# Patient Record
Sex: MALE | Race: WHITE | NOT HISPANIC OR LATINO | Employment: OTHER | URBAN - NONMETROPOLITAN AREA
[De-identification: names, ages, dates, MRNs, and addresses within clinical notes are randomized per-mention and may not be internally consistent; named-entity substitution may affect disease eponyms.]

---

## 2018-01-23 ENCOUNTER — OFFICE VISIT (OUTPATIENT)
Dept: CARDIOLOGY | Facility: CLINIC | Age: 33
End: 2018-01-23

## 2018-01-23 VITALS
BODY MASS INDEX: 35.64 KG/M2 | SYSTOLIC BLOOD PRESSURE: 131 MMHG | OXYGEN SATURATION: 97 % | HEIGHT: 71 IN | DIASTOLIC BLOOD PRESSURE: 83 MMHG | WEIGHT: 254.6 LBS | HEART RATE: 101 BPM

## 2018-01-23 DIAGNOSIS — R07.2 PRECORDIAL PAIN: ICD-10-CM

## 2018-01-23 DIAGNOSIS — R55 SYNCOPE, UNSPECIFIED SYNCOPE TYPE: ICD-10-CM

## 2018-01-23 DIAGNOSIS — I10 ESSENTIAL HYPERTENSION: ICD-10-CM

## 2018-01-23 DIAGNOSIS — R00.2 PALPITATIONS: ICD-10-CM

## 2018-01-23 PROCEDURE — 99204 OFFICE O/P NEW MOD 45 MIN: CPT | Performed by: PHYSICIAN ASSISTANT

## 2018-01-23 PROCEDURE — 93000 ELECTROCARDIOGRAM COMPLETE: CPT | Performed by: PHYSICIAN ASSISTANT

## 2018-01-23 NOTE — PROGRESS NOTES
"Lindsay Naik is a 33 y.o. male     Chief Complaint   Patient presents with   • Establish Care     patient appears in office today to est cardiac care   • Chest Pain   • Hypertension       HPI  The patient presents today for initial evaluation.  He presents because of syncope and chest pain.  The patient tells me that he was recently calling a car back from [place].  While in the [employer] area, he started noticing weakness and dizziness.  He pulled to the side of the road.  His wife was with him at that time.  He started noticing some degree of tachycardia.  He then developed diaphoresis and nausea.  Shortly after onset of all the symptoms, the patient tells me that he lost complete consciousness.  He was out for at least 3-5 minutes.  EMS was alerted and he was taken to the emergency room.  With the above episode, the patient does tell me that he again had mild chest tightness.  He had a sensation of tachycardia as well.  He had no neurologic symptoms including loss of bowel or bladder continence.  There was no seizure activity reported.  At the emergency room, labs and radiology studies were unremarkable.  He was diagnosed with \"vertigo\" and advised to pursue further evaluation as an outpatient.  Since the above time, the patient has had some degree of mild dizziness, but nothing of significance.  He certainly is had no further syncopal episode.  He has mild chest tightness at times, typically when stressed.  He has had no rhythm disturbance of significance.  He has had no failure or further dysrhythmic symptoms.  He has no further complaints otherwise.      Current Outpatient Prescriptions   Medication Sig Dispense Refill   • metoprolol tartrate (LOPRESSOR) 25 MG tablet Take 25 mg by mouth Daily. 1 and 1/2 tablets daily     • sertraline (ZOLOFT) 50 MG tablet Take 50 mg by mouth Daily.       No current facility-administered medications for this visit.        Review of patient's allergies " indicates no known allergies.    Past Medical History:   Diagnosis Date   • Hypertension    • Pancreatitis        Social History     Social History   • Marital status:      Spouse name: N/A   • Number of children: N/A   • Years of education: N/A     Occupational History   • Not on file.     Social History Main Topics   • Smoking status: Current Every Day Smoker     Packs/day: 1.00     Years: 15.00     Types: Cigarettes   • Smokeless tobacco: Never Used   • Alcohol use No   • Drug use: No   • Sexual activity: Defer     Other Topics Concern   • Not on file     Social History Narrative   • No narrative on file       Family History   Problem Relation Age of Onset   • No Known Problems Mother    • Diabetes Father    • Hypertension Father    • No Known Problems Sister        Review of Systems   Constitutional: Negative.  Negative for fatigue.   HENT: Negative for congestion, rhinorrhea, sneezing and sore throat.    Eyes: Positive for visual disturbance (wears contacts daily).   Respiratory: Positive for shortness of breath (easily SOA ; worse on exertion ). Negative for apnea, cough, chest tightness and wheezing.    Cardiovascular: Positive for chest pain (R anterior chest wall ). Negative for palpitations (denies palpitations) and leg swelling.   Gastrointestinal: Negative.  Negative for abdominal distention, abdominal pain, nausea and vomiting.   Endocrine: Negative.  Negative for cold intolerance, heat intolerance, polyphagia and polyuria.   Genitourinary: Negative.  Negative for difficulty urinating, frequency and urgency.   Musculoskeletal: Negative.  Negative for arthralgias, back pain, myalgias, neck pain and neck stiffness.   Skin: Negative.  Negative for rash and wound.   Allergic/Immunologic: Negative.  Negative for environmental allergies and food allergies.   Neurological: Positive for headaches (with HTN). Negative for dizziness, weakness and light-headedness.   Psychiatric/Behavioral: Positive for  "agitation (easily agitated), confusion (easily confused) and sleep disturbance (pt states he does wake up smothering/SOA ). The patient is not nervous/anxious.        Objective     Vitals:    01/23/18 1451 01/23/18 1524 01/23/18 1533 01/23/18 1534   BP: 136/92 128/90 153/92 131/83   BP Location: Left arm Left arm Left arm Left arm   Patient Position: Sitting Lying Sitting Standing   Pulse: 94 94 99 101   SpO2: 97%      Weight: 115 kg (254 lb 9.6 oz)      Height: 180.3 cm (71\")           /83 (BP Location: Left arm, Patient Position: Standing)  Pulse 101  Ht 180.3 cm (71\")  Wt 115 kg (254 lb 9.6 oz)  SpO2 97%  BMI 35.51 kg/m2     Lab Results (most recent)     None          Physical Exam   Constitutional: He is oriented to person, place, and time. He appears well-developed and well-nourished. No distress.   HENT:   Head: Normocephalic and atraumatic.   Eyes: Conjunctivae and EOM are normal. Pupils are equal, round, and reactive to light.   Neck: Normal range of motion. Neck supple. No JVD present. No tracheal deviation present.   Cardiovascular: Normal rate, regular rhythm, normal heart sounds and intact distal pulses.    Pulmonary/Chest: Effort normal and breath sounds normal.   Abdominal: Soft. Bowel sounds are normal. He exhibits no distension and no mass. There is no tenderness. There is no rebound and no guarding.   Musculoskeletal: Normal range of motion. He exhibits no edema, tenderness or deformity.   Neurological: He is alert and oriented to person, place, and time.   Skin: Skin is warm and dry. No rash noted. No erythema. No pallor.   Psychiatric: He has a normal mood and affect. His behavior is normal. Judgment and thought content normal.   Nursing note and vitals reviewed.      Procedure     ECG 12 Lead  Date/Time: 1/23/2018 2:55 PM  Performed by: CHACORTA RED  Authorized by: CHACORTA RED   Comments: HTN    Sinus rhythm with artifact noted, rate of 101, right axis deviation, nonspecific " ST and T-wave changes, early precordial R-wave progression, no acute changes noted.                   Assessment/Plan      Diagnosis Plan   1. Syncope, unspecified syncope type  Treadmill Stress Test    Adult Transthoracic Echo Complete W/ Cont if Necessary Per Protocol    Cardiac Event Monitor   2. Precordial pain  Treadmill Stress Test    Adult Transthoracic Echo Complete W/ Cont if Necessary Per Protocol    Cardiac Event Monitor   3. Essential hypertension  ECG 12 Lead    Treadmill Stress Test    Adult Transthoracic Echo Complete W/ Cont if Necessary Per Protocol    Cardiac Event Monitor   4. Palpitations  Treadmill Stress Test    Adult Transthoracic Echo Complete W/ Cont if Necessary Per Protocol    Cardiac Event Monitor       With the patient's syncopal episode, I feel that he needs evaluation for dysrhythmias.  I will schedule for an event monitor to evaluate that further.  We will also schedule for stress test and an echo for risk stratification given all the above symptoms.  If the above studies are unremarkable, we may consider evaluation with a tilt table test, particularly given description of the patient's syncopal episode.  By description, he had vasovagal/neurocardiogenic syncope.  I would like to see results the above studies first and then we can recommend further at that time.  He tells me that he had recent labs.  I've requested copies of those.  For further issues, he will call to us.             Electronically signed by:

## 2018-02-11 ENCOUNTER — OUTSIDE FACILITY SERVICE (OUTPATIENT)
Dept: CARDIOLOGY | Facility: CLINIC | Age: 33
End: 2018-02-11

## 2018-02-11 PROCEDURE — 93272 ECG/REVIEW INTERPRET ONLY: CPT | Performed by: INTERNAL MEDICINE

## 2022-11-30 ENCOUNTER — OFFICE VISIT (OUTPATIENT)
Dept: CARDIOLOGY | Facility: CLINIC | Age: 37
End: 2022-11-30

## 2022-11-30 VITALS
HEIGHT: 71 IN | DIASTOLIC BLOOD PRESSURE: 93 MMHG | OXYGEN SATURATION: 96 % | SYSTOLIC BLOOD PRESSURE: 133 MMHG | BODY MASS INDEX: 40.18 KG/M2 | HEART RATE: 80 BPM | WEIGHT: 287 LBS

## 2022-11-30 DIAGNOSIS — R07.2 PRECORDIAL PAIN: Primary | ICD-10-CM

## 2022-11-30 DIAGNOSIS — R06.09 DYSPNEA ON EXERTION: ICD-10-CM

## 2022-11-30 DIAGNOSIS — I10 PRIMARY HYPERTENSION: ICD-10-CM

## 2022-11-30 PROCEDURE — 99204 OFFICE O/P NEW MOD 45 MIN: CPT | Performed by: PHYSICIAN ASSISTANT

## 2022-11-30 RX ORDER — TIZANIDINE 4 MG/1
4 TABLET ORAL NIGHTLY PRN
COMMUNITY

## 2022-11-30 RX ORDER — OMEPRAZOLE 20 MG/1
20 CAPSULE, DELAYED RELEASE ORAL DAILY
COMMUNITY

## 2022-11-30 RX ORDER — LISINOPRIL 20 MG/1
20 TABLET ORAL DAILY
COMMUNITY

## 2022-11-30 RX ORDER — CELECOXIB 200 MG/1
200 CAPSULE ORAL DAILY
COMMUNITY

## 2022-11-30 NOTE — PROGRESS NOTES
Subjective   Natanael Naik is a 37 y.o. male     Chief Complaint   Patient presents with   • Establish Care     Chest pain       HPI  The patient presents to clinic today to reestablish cardiovascular care.  We had seen him historically in setting of syncope and palpitations.  We had recommended stress test, echo, and event monitor.  He only had the event monitor performed for unknown reasons.  That study was benign.  That evaluation was in 2018.  Since, the patient had done well up until recently.  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  He felt that this was just his chronic vertiginous issues.  Shortly after this however, the patient had onset of chest tightness.  He reports that there is no referral to the neck, arm, or jaw.  This persisted for a good 30 minutes before this resolved.  He tells me that he eventually found a store where he purchased an aspirin.  He took this and he feels that this was beneficial in resolving his chest pain.  He reports that he was slightly more dyspneic at that time.  He had no palpitations or syncope at that time.  Since that time, the patient has had no further chest tightness.  He reports no failure nor continued dysrhythmic symptoms.  He did go to see his primary care provider where an EKG was performed.  That was benign.  He was advised to proceed on for repeat cardiac evaluation from cardiac standpoint because of symptoms.  He presents today in that setting.      Current Outpatient Medications   Medication Sig Dispense Refill   • celecoxib (CeleBREX) 200 MG capsule Take 200 mg by mouth Daily.     • lisinopril (PRINIVIL,ZESTRIL) 20 MG tablet Take 20 mg by mouth Daily.     • metoprolol tartrate (LOPRESSOR) 25 MG tablet Take 25 mg by mouth Daily.     • omeprazole (priLOSEC) 20 MG capsule Take 20 mg by mouth Daily.     • sertraline (ZOLOFT) 50 MG tablet Take 50 mg by mouth Daily.     • tiZANidine (ZANAFLEX) 4 MG tablet Take 4 mg  by mouth At Night As Needed for Muscle Spasms.       No current facility-administered medications for this visit.       Patient has no known allergies.    Past Medical History:   Diagnosis Date   • Hypertension    • Pancreatitis        Social History     Socioeconomic History   • Marital status:    Tobacco Use   • Smoking status: Former     Packs/day: 1.00     Years: 15.00     Pack years: 15.00     Types: Cigarettes     Quit date:      Years since quittin.9   • Smokeless tobacco: Never   Substance and Sexual Activity   • Alcohol use: No   • Drug use: No   • Sexual activity: Defer       Family History   Problem Relation Age of Onset   • No Known Problems Mother    • Diabetes Father    • Hypertension Father    • No Known Problems Sister        Review of Systems   Constitutional: Negative.  Negative for activity change, appetite change, chills, fatigue and fever.   HENT: Negative.    Eyes: Negative.  Negative for visual disturbance.   Respiratory: Negative.  Negative for apnea, cough, shortness of breath and wheezing.    Cardiovascular: Positive for chest pain. Negative for palpitations and leg swelling.   Gastrointestinal: Negative.  Negative for blood in stool.   Endocrine: Negative.  Negative for cold intolerance and heat intolerance.   Genitourinary: Negative.  Negative for hematuria.   Musculoskeletal: Positive for arthralgias and back pain (Spinal stenosis). Negative for gait problem, joint swelling, neck pain and neck stiffness.   Skin: Negative.  Negative for color change and wound.   Allergic/Immunologic: Negative.  Negative for environmental allergies and food allergies.   Neurological: Negative.  Negative for dizziness, syncope, weakness, light-headedness, numbness and headaches.   Hematological: Negative.  Does not bruise/bleed easily.   Psychiatric/Behavioral: Negative.  Negative for sleep disturbance.       Objective     Vitals:    22 1011   BP: 133/93   BP Location: Left arm  "  Patient Position: Sitting   Cuff Size: Adult   Pulse: 80   SpO2: 96%   Weight: 130 kg (287 lb)   Height: 180.3 cm (71\")        /93 (BP Location: Left arm, Patient Position: Sitting, Cuff Size: Adult)   Pulse 80   Ht 180.3 cm (71\")   Wt 130 kg (287 lb)   SpO2 96%   BMI 40.03 kg/m²      Lab Results (most recent)     None          Physical Exam  Vitals and nursing note reviewed.   Constitutional:       General: He is not in acute distress.     Appearance: He is well-developed.   HENT:      Head: Normocephalic and atraumatic.   Eyes:      Conjunctiva/sclera: Conjunctivae normal.      Pupils: Pupils are equal, round, and reactive to light.   Neck:      Vascular: No JVD.      Trachea: No tracheal deviation.   Cardiovascular:      Rate and Rhythm: Normal rate and regular rhythm.      Heart sounds: Normal heart sounds.   Pulmonary:      Effort: Pulmonary effort is normal.      Breath sounds: Normal breath sounds.   Abdominal:      General: Bowel sounds are normal. There is no distension.      Palpations: Abdomen is soft. There is no mass.      Tenderness: There is no abdominal tenderness. There is no guarding or rebound.   Musculoskeletal:         General: No tenderness or deformity. Normal range of motion.      Cervical back: Normal range of motion and neck supple.   Skin:     General: Skin is warm and dry.      Coloration: Skin is not pale.      Findings: No erythema or rash.   Neurological:      Mental Status: He is alert and oriented to person, place, and time.   Psychiatric:         Behavior: Behavior normal.         Thought Content: Thought content normal.         Judgment: Judgment normal.         Procedure   Procedures         Assessment & Plan      Diagnosis Plan   1. Precordial pain  Treadmill Stress Test    Adult Transthoracic Echo Complete W/ Cont if Necessary Per Protocol      2. Dyspnea on exertion        3. Primary hypertension          1.  The patient presents for reestablishment of cardiac care " given chest tightness as above.  I would like to schedule for regular treadmill stress test for ischemia assessment at this time.    2.  We will schedule for an echocardiogram for structural evaluation, particularly given symptoms as above.    3.  He appears mostly normotensive on current medical regimen.  I will continue that without change.    4.  We will see him back to review stress and echo findings and recommended further.  He will call for any issues prior to follow-up           Patient brought in medicine bottles to appointment, they have been gone through with the patient. Med list was updated in the chart.       Electronically signed by:

## 2025-01-08 ENCOUNTER — TELEPHONE (OUTPATIENT)
Dept: CARDIOLOGY | Facility: CLINIC | Age: 40
End: 2025-01-08
Payer: COMMERCIAL

## 2025-01-08 NOTE — TELEPHONE ENCOUNTER
Called to thien cancelled appt and pt does not want to at this time, advised pt to call the office if any issues arise